# Patient Record
Sex: MALE | Race: BLACK OR AFRICAN AMERICAN | Employment: PART TIME | URBAN - METROPOLITAN AREA
[De-identification: names, ages, dates, MRNs, and addresses within clinical notes are randomized per-mention and may not be internally consistent; named-entity substitution may affect disease eponyms.]

---

## 2017-05-28 ENCOUNTER — HOSPITAL ENCOUNTER (EMERGENCY)
Age: 22
Discharge: HOME | End: 2017-05-28
Attending: EMERGENCY MEDICINE
Payer: COMMERCIAL

## 2017-05-28 VITALS
TEMPERATURE: 98.6 F | WEIGHT: 165 LBS | BODY MASS INDEX: 22.35 KG/M2 | HEART RATE: 73 BPM | SYSTOLIC BLOOD PRESSURE: 136 MMHG | DIASTOLIC BLOOD PRESSURE: 61 MMHG | RESPIRATION RATE: 16 BRPM | HEIGHT: 72 IN | OXYGEN SATURATION: 99 %

## 2017-05-28 DIAGNOSIS — H20.9 IRITIS OF BOTH EYES: Primary | ICD-10-CM

## 2017-05-28 PROCEDURE — 99283 EMERGENCY DEPT VISIT LOW MDM: CPT

## 2017-05-28 RX ORDER — TETRACAINE HYDROCHLORIDE 5 MG/ML
1 SOLUTION OPHTHALMIC
Status: DISCONTINUED | OUTPATIENT
Start: 2017-05-28 | End: 2017-05-28 | Stop reason: HOSPADM

## 2017-05-28 RX ORDER — HYDROCODONE BITARTRATE AND ACETAMINOPHEN 5; 325 MG/1; MG/1
1 TABLET ORAL
Qty: 12 TAB | Refills: 0 | Status: SHIPPED | OUTPATIENT
Start: 2017-05-28

## 2017-05-28 NOTE — ED TRIAGE NOTES
Patient arrives with c/o bilateral eye irritation onset this morning. Denies injury. Redness noted in triage.

## 2017-05-29 NOTE — DISCHARGE INSTRUCTIONS
We hope that we have addressed all of your medical concerns. The examination and treatment you received in the Emergency Department were for an emergent problem and were not intended as complete care. It is important that you follow up with your healthcare provider(s) for ongoing care. If your symptoms worsen or do not improve as expected, and you are unable to reach your usual health care provider(s), you should return to the Emergency Department. Today's healthcare is undergoing tremendous change, and patient satisfaction surveys are one of the many tools to assess the quality of medical care. You may receive a survey from the [employer] regarding your experience in the Emergency Department. I hope that your experience has been completely positive, particularly the medical care that I provided. As such, please participate in the survey; anything less than excellent does not meet my expectations or intentions. [facility] and [address] [place] [address] participate in nationally recognized quality of care measures. If your blood pressure is greater than 120/80, as reported below, we urge that you seek medical care to address the potential of high blood pressure, commonly known as hypertension. Hypertension can be hereditary or can be caused by certain medical conditions, pain, stress, or \"white coat syndrome. \"       Please make an appointment with your health care provider(s) for follow up of your Emergency Department visit. VITALS:   Patient Vitals for the past 8 hrs:   Temp Pulse Resp BP SpO2   05/28/17 1916 98.6 °F (37 °C) 73 16 136/61 99 %          Thank you for allowing us to provide you with medical care today. We realize that you have many choices for your emergency care needs. Please choose us in the future for any continued health care needs. Rashi Bernal, [address] [place] [address] [address]   Office: [phone]            No results found for this or any previous visit (from the past 24 hour(s)). No results found. Iritis: Care Instructions  Your Care Instructions    Iritis is an inflammation of the colored part of the eye. This part of the eye is called the iris. Iritis can cause redness and pain. It can make your eyes more sensitive to light. And it may make your pupils different sizes. Iritis is most often treated with prescription eyedrops. Treatment can usually prevent long-term problems with vision. Iritis usually lasts 6 to 8 weeks. You will need follow-up care with an eye doctor (ophthalmologist). Uveitis (say \"you-vee-EYE-tus\") and iridocyclitis (say \"zwb-gy-ppf-hank-KLY-tus\") are other terms used to refer to this problem. Follow-up care is a key part of your treatment and safety. Be sure to make and go to all appointments, and call your doctor if you are having problems. It's also a good idea to know your test results and keep a list of the medicines you take. How can you care for yourself at home? · If the doctor gave you eyedrops, use them exactly as directed. Use the medicine for as long as instructed, even if your eye starts to look better soon. Call your doctor if you think you are having a problem with your eyedrops. Wash your hands well before and after you put in eyedrops. · To put in eyedrops or ointment:  ¨ Tilt your head back, and pull your lower eyelid down with one finger. ¨ Drop or squirt the medicine inside the lower lid. ¨ Close your eye for 30 to 60 seconds to let the drops or ointment move around. ¨ Do not touch the ointment or dropper tip to your eyelashes or any other surface. · Take an over-the-counter pain medicine, such as acetaminophen (Tylenol), ibuprofen (Advil, Motrin), or naproxen (Aleve). Read and follow all instructions on the label. · Make sure you go to all of your follow-up appointments.  You will need a complete eye exam from an eye doctor. When should you call for help? Call your doctor now or seek immediate medical care if:  · You have new or increasing eye pain. · You have vision changes in either eye. Watch closely for changes in your health, and be sure to contact your doctor if:  · You have new or worse symptoms. · You do not get better as expected. Where can you learn more? Go to http://chanel-jenni.info/. Enter B112 in the search box to learn more about \"Iritis: Care Instructions. \"  Current as of: May 23, 2016  Content Version: 11.2  © 2487-7196 [employer]. Care instructions adapted under license by [employer] (which disclaims liability or warranty for this information). If you have questions about a medical condition or this instruction, always ask your healthcare professional. [address] any warranty or liability for your use of this information.

## 2017-05-29 NOTE — ED PROVIDER NOTES
HPI Comments: Polina Degroot is a 19 y.o. male who presents ambulatory to ER with c/o bilateral eye redness and tearing x waking up this morning. Pt states that he accidentally slept in his contacts last night and has had bilat eye redness, pain, and tearing since taht time. Notes took contacts out immediately this morning however \"it didn't help\". No visual changes. No blurred vision. Notes eyes sensitive to light. Went to pt first and was dx with conjunctivitis. Started on ofloxacin drops. Notes did not use yet and came to ER bc \"they put some numbing medicine in my eye and said if it wears off within 30mins to return and it did'. No other complaints. He specifically denies any fevers, chills, nausea, vomiting, chest pain, shortness of breath, headache, rash, diarrhea, abdominal pain, urinary/bowel changes, sweating or weight loss. PCP: Not On File [facility]   PMHx significant for: History reviewed. No pertinent past medical history. PSHx significant for: Past Surgical History:  No date: HX ORTHOPAEDIC      Comment: left knee       No Known Allergies    There are no other complaints, changes or physical findings at this time. The history is provided by the patient. History reviewed. No pertinent past medical history. Past Surgical History:   Procedure Laterality Date    HX ORTHOPAEDIC      left knee         History reviewed. No pertinent family history. Social History     Social History    Marital status: SINGLE     Spouse name: N/A    Number of children: N/A    Years of education: N/A     Occupational History    Not on file. Social History Main Topics    Smoking status: Never Smoker    Smokeless tobacco: Not on file    Alcohol use No    Drug use: No    Sexual activity: Not on file     Other Topics Concern    Not on file     Social History Narrative         ALLERGIES: Review of patient's allergies indicates no known allergies. Review of Systems   Constitutional: Negative. HENT: Negative. Eyes: Positive for photophobia, pain, discharge and redness. Negative for visual disturbance. Respiratory: Negative. Cardiovascular: Negative. Gastrointestinal: Negative. Genitourinary: Negative. Musculoskeletal: Negative. Skin: Negative. Neurological: Negative. Hematological: Negative. Psychiatric/Behavioral: Negative. All other systems reviewed and are negative. Vitals:    05/28/17 1916   BP: 136/61   Pulse: 73   Resp: 16   Temp: 98.6 °F (37 °C)   SpO2: 99%   Weight: 74.8 kg (165 lb)   Height: 6' (1.829 m)            Physical Exam   Constitutional: He is oriented to person, place, and time. He appears well-developed and well-nourished. No distress. HENT:   Head: Normocephalic and atraumatic. Eyes: EOM are normal. Pupils are equal, round, and reactive to light. Lids are everted and swept, no foreign bodies found. Right eye exhibits discharge (watery). Right eye exhibits no exudate. No foreign body present in the right eye. Left eye exhibits discharge (watery). Left eye exhibits no exudate. No foreign body present in the left eye. Right conjunctiva is injected. Left conjunctiva is injected. Slit lamp exam:       The right eye shows no fluorescein uptake. The left eye shows no fluorescein uptake. Neck: Normal range of motion. Cardiovascular: Intact distal pulses and normal pulses. Musculoskeletal: Normal range of motion. He exhibits no edema or tenderness. Neurological: He is alert and oriented to person, place, and time. He has normal strength. No sensory deficit. Skin: Skin is warm and dry. No rash noted. He is not diaphoretic. No erythema. No pallor. Psychiatric: He has a normal mood and affect. His behavior is normal.   Nursing note and vitals reviewed.        MDM  Number of Diagnoses or Management Options  Iritis of both eyes:   Diagnosis management comments: DDx; corneal abrasion, ulcer, conjunctivitis, iritis       Amount and/or Complexity of Data Reviewed  Obtain history from someone other than the patient: yes  Discuss the patient with other providers: yes    Patient Progress  Patient progress: stable    ED Course       Procedures                       8:29 PM   Taras Carrel, PA-C discussed patient with Mike Medina. Vivian Min MD who is in agreement with care plan as outlined. No further recommendations. Taras Carrel, PA-C      Procedure Note - Wood's lamp exam:  8:29 PM  Performed by: Taras Carrel, PA-C   Pts R eye was anesthetized with tetracaine, stained with fluorescein, and examined with a Wood's lamp, using lid eversion. Foreign body: no  Fluorescein uptake: no, showing no corneal abrasion  The procedure took 1-15 minutes, and pt tolerated well. Procedure Note - Wood's lamp exam:  8:29 PM  Performed by: Taras Carrel, PA-C   Pts L eye was anesthetized with tetracaine, stained with fluorescein, and examined with a Wood's lamp, using lid eversion. Foreign body: no  Fluorescein uptake: no, showing no corneal abrasion  The procedure took 1-15 minutes, and pt tolerated well.    8:29 PM  Pt has been reevaluated. There are no new complaints, changes, or physical findings at this time. Medications have been reviewed w/ pt and/or family. Pt and/or family's questions have been answered. Pt and/or family expressed good understanding of the dx/tx/rx and is in agreement with plan of care. Pt instructed and agreed to f/u w/ ophtho and to return to ED upon further deterioration. Pt is ready for discharge. LABORATORY TESTS:  No results found for this or any previous visit (from the past 12 hour(s)). IMAGING RESULTS:  No orders to display     No results found. MEDICATIONS GIVEN:  Medications   fluorescein (FUL-PAT) 1 mg ophthalmic strip 1 Strip (not administered)   tetracaine (PONTOCAINE) 0.5 % ophthalmic solution 1 Drop (not administered)       IMPRESSION:  1. Iritis of both eyes        PLAN:  1.    Current Discharge Medication List      START taking these medications    Details   HYDROcodone-acetaminophen (NORCO) 5-325 mg per tablet Take 1 Tab by mouth every four (4) hours as needed for Pain. Max Daily Amount: 6 Tabs. Qty: 12 Tab, Refills: 0           2.    Follow-up Information     Follow up With Details Comments Contact Info    your ophthamologist Schedule an appointment as soon as possible for a visit in 2 days      [facility] EMERGENCY DEPT  If symptoms worsen [address] [facility]  [phone]            Return to ED if worse